# Patient Record
Sex: FEMALE | Race: BLACK OR AFRICAN AMERICAN | NOT HISPANIC OR LATINO | Employment: OTHER | URBAN - METROPOLITAN AREA
[De-identification: names, ages, dates, MRNs, and addresses within clinical notes are randomized per-mention and may not be internally consistent; named-entity substitution may affect disease eponyms.]

---

## 2017-01-16 PROBLEM — Z13.9 SCREENING: Status: ACTIVE | Noted: 2017-01-16

## 2017-01-16 PROBLEM — Z12.11 ENCOUNTER FOR SCREENING COLONOSCOPY: Status: ACTIVE | Noted: 2017-01-16

## 2017-11-13 ENCOUNTER — TELEPHONE (OUTPATIENT)
Dept: ADMINISTRATIVE | Facility: HOSPITAL | Age: 62
End: 2017-11-13

## 2018-05-01 ENCOUNTER — TELEPHONE (OUTPATIENT)
Dept: ADMINISTRATIVE | Facility: HOSPITAL | Age: 63
End: 2018-05-01

## 2018-05-19 ENCOUNTER — HOSPITAL ENCOUNTER (OUTPATIENT)
Dept: SLEEP MEDICINE | Facility: HOSPITAL | Age: 63
Discharge: HOME | End: 2018-05-19
Attending: INTERNAL MEDICINE
Payer: MEDICAID

## 2018-05-19 DIAGNOSIS — G47.33 OSA (OBSTRUCTIVE SLEEP APNEA): ICD-10-CM

## 2018-05-19 DIAGNOSIS — G47.33 OBSTRUCTIVE SLEEP APNEA (ADULT) (PEDIATRIC): ICD-10-CM

## 2018-05-19 PROCEDURE — 95810 POLYSOM 6/> YRS 4/> PARAM: CPT | Mod: 26,,, | Performed by: INTERNAL MEDICINE

## 2018-05-19 PROCEDURE — 95810 POLYSOM 6/> YRS 4/> PARAM: CPT

## 2018-06-06 PROBLEM — H18.453 SALZMANN'S NODULAR DEGENERATION OF CORNEAS OF BOTH EYES: Status: ACTIVE | Noted: 2018-06-06

## 2018-06-06 PROBLEM — H01.006 BLEPHARITIS OF BOTH EYES: Status: ACTIVE | Noted: 2018-06-06

## 2018-06-06 PROBLEM — H01.003 BLEPHARITIS OF BOTH EYES: Status: ACTIVE | Noted: 2018-06-06

## 2018-06-13 PROBLEM — Z78.9 STATIN INTOLERANCE: Status: ACTIVE | Noted: 2018-06-13

## 2018-06-13 PROBLEM — E83.52 HYPERCALCEMIA: Status: ACTIVE | Noted: 2018-06-13

## 2018-06-15 ENCOUNTER — HOSPITAL ENCOUNTER (OUTPATIENT)
Dept: SLEEP MEDICINE | Facility: HOSPITAL | Age: 63
Discharge: HOME | End: 2018-06-15
Attending: INTERNAL MEDICINE
Payer: MEDICAID

## 2018-06-15 DIAGNOSIS — G47.33 OBSTRUCTIVE SLEEP APNEA (ADULT) (PEDIATRIC): ICD-10-CM

## 2018-06-15 PROCEDURE — 95811 POLYSOM 6/>YRS CPAP 4/> PARM: CPT

## 2018-06-15 PROCEDURE — 95811 POLYSOM 6/>YRS CPAP 4/> PARM: CPT | Mod: 26,,, | Performed by: INTERNAL MEDICINE

## 2018-06-29 PROBLEM — G47.34 NOCTURNAL HYPOXEMIA: Status: ACTIVE | Noted: 2018-06-29

## 2019-03-12 PROBLEM — G47.34 NOCTURNAL HYPOXEMIA: Status: RESOLVED | Noted: 2018-06-29 | Resolved: 2019-03-12

## 2019-05-01 PROBLEM — E78.5 HYPERLIPIDEMIA ASSOCIATED WITH TYPE 2 DIABETES MELLITUS: Status: ACTIVE | Noted: 2019-05-01

## 2019-05-01 PROBLEM — E11.69 HYPERLIPIDEMIA ASSOCIATED WITH TYPE 2 DIABETES MELLITUS: Status: ACTIVE | Noted: 2019-05-01

## 2019-05-01 PROBLEM — Z13.820 SCREENING FOR OSTEOPOROSIS: Status: ACTIVE | Noted: 2019-05-01

## 2019-08-05 PROBLEM — Z13.820 SCREENING FOR OSTEOPOROSIS: Status: RESOLVED | Noted: 2019-05-01 | Resolved: 2019-08-05

## 2019-11-20 PROBLEM — I50.30 HEART FAILURE WITH PRESERVED EJECTION FRACTION, NYHA CLASS I: Status: ACTIVE | Noted: 2019-11-20

## 2019-11-20 PROBLEM — E11.69 HYPERLIPIDEMIA ASSOCIATED WITH TYPE 2 DIABETES MELLITUS: Status: RESOLVED | Noted: 2019-05-01 | Resolved: 2019-11-20

## 2019-11-20 PROBLEM — E78.5 HYPERLIPIDEMIA ASSOCIATED WITH TYPE 2 DIABETES MELLITUS: Status: RESOLVED | Noted: 2019-05-01 | Resolved: 2019-11-20

## 2019-11-20 PROBLEM — G47.33 OBSTRUCTIVE SLEEP APNEA: Status: ACTIVE | Noted: 2019-11-20

## 2020-01-16 PROBLEM — Z90.721 HISTORY OF SALPINGO-OOPHORECTOMY: Status: ACTIVE | Noted: 2020-01-16

## 2020-01-16 PROBLEM — Z01.818 PREOP EXAMINATION: Status: RESOLVED | Noted: 2020-01-16 | Resolved: 2020-01-16

## 2020-01-16 PROBLEM — Z01.818 PREOP EXAMINATION: Status: ACTIVE | Noted: 2020-01-16

## 2020-01-16 PROBLEM — N94.89 ADNEXAL MASS: Status: ACTIVE | Noted: 2020-01-16

## 2020-01-16 PROBLEM — Z90.79 HISTORY OF SALPINGO-OOPHORECTOMY: Status: ACTIVE | Noted: 2020-01-16

## 2020-01-16 PROBLEM — Z98.890 S/P EXPLORATORY LAPAROTOMY: Status: ACTIVE | Noted: 2020-01-16

## 2020-01-17 PROBLEM — D62 ACUTE BLOOD LOSS ANEMIA: Status: ACTIVE | Noted: 2020-01-17

## 2020-01-20 ENCOUNTER — PATIENT OUTREACH (OUTPATIENT)
Dept: ADMINISTRATIVE | Facility: CLINIC | Age: 65
End: 2020-01-20

## 2020-01-20 NOTE — PATIENT INSTRUCTIONS
Exploratory Laparotomy     This is an example of the kind of incision the surgeon may use to perform exploratory laparotomy.   Exploratory laparotomy is surgery to open up the belly area (abdomen). This surgery is done to find the cause of problems (such as belly pain or bleeding) that testing could not diagnose. It is also used when an abdominal injury needs emergency medical care. This surgery uses one large cut (incision). The provider can then see and check the organs inside the abdomen. If the cause of the problem is found during the procedure, then treatment is often done at the same time. In some cases, a minimally invasive surgery called exploratory laparoscopy may be used instead. That method uses a tiny camera and several small incisions. But in many cases an exploratory laparotomy is preferred. Read on to learn more about this procedure.  Reasons for the surgery  Organs that may be looked at during exploratory laparotomy include:  · Liver  · Gallbladder  · Spleen  · Pancreas  · Kidneys  · Stomach  · Small intestine (small bowel)  · Large intestine (colon or large bowel)  · Appendix  · Ovaries, fallopian tubes, and uterus (in women)  · Lymph nodes   · Abdominal blood vessels   · Membranes that line the abdominal cavity  Getting ready for the surgery  The surgery takes place in a hospital. It is done by a surgeon. You will likely stay in the hospital for a few days or longer. To prepare for the surgery, do the following:  · Tell your provider about any medicines youre taking. This includes over-the-counter medicines, prescription medicines, herbs, street drugs, herbs, vitamins, and other supplements. You may need to stop taking some or all of them for a time before the surgery.  · Tell your provider if you drink alcohol. This is very important if you are a heavy drinker. Alcohol withdrawal can be life-threatening, so be honest with your provider.  · Also tell your provider if you have any allergies or  other health problems. This includes recent illnesses, especially any bleeding problems.  · Follow any directions you are given for not eating or drinking before surgery.  The day of the surgery  · Most exploratory laparotomies are done as emergency surgery after an injury or accident.   · You will be checked for risks of heart, lung, or other problems during surgery.   · You may need to change into a hospital gown.  · Before the surgery begins, an IV (intravenous) line is put into a vein in your arm or hand. This line supplies fluids and medicines.  · You will be given medicine (general anesthesia) to keep you free of pain. This medicine puts you in a state like deep sleep during the surgery.   · A tube may be placed through your mouth and into your throat to help with breathing. Also, monitors are attached to your body. These record your vital signs, such as heart rate and blood pressure, during the surgery.   · A thin tube (catheter) is placed into your bladder. This tube drains urine from your bladder during the surgery.  During the surgery  · The skin over your belly is cleaned.  · An incision is made in your belly.  · The tissue, blood vessels, and organs in your belly are carefully looked at and checked for problems.  · Tissue samples (biopsy) may be removed and sent to a lab for study.  · If the cause of the problem is found, treatment may be done then, if needed.  · When the surgery is done, the incision is closed with stitches (sutures) or staples. A drain may be placed in the abdomen to remove any extra fluids.  After the surgery  · You will be taken to a room to rest until you have recovered from the anesthesia. Nurses will closely watch your condition. When you are more awake and alert, you will be moved to another room.  · Medicines are given to help prevent infection and to manage pain, if needed.  · You will not be given food or drink until your bowels start to work normally again. This may take a  few days.  · You will need to get up and walk around as soon as you are able. This helps to prevent blood clots.  · Also, you may be given breathing exercises to do. These help prevent pneumonia.  · The tube to drain urine is usually removed within a few days.  · If a drain was used for your incision, this is also removed.  · You will be able to go home when the provider says there are no issues of concern.  · Arrange for an adult family member or friend to drive you home.  · Before leaving, make sure you have all the prescriptions and home care instructions you will need. Also make sure you have a contact number for your provider or the hospital. This is in case you have problems or questions after the surgery.  · Do not lift anything heavier than 5 pounds for about 6 weeks. This gives tissue time to heal, and can prevent a hernia.  When to call your provider  After you get home, call your healthcare provider if you have:  · Fever of 100.4°F (38.0°C) or higher, or as advised by your provider  · Increased pain, redness, swelling, bleeding, or drainage at the incision site  · Pain that cannot be controlled with medicines  · Swollen belly  · Diarrhea or constipation that does not get better within 2 days  · Bloody or black, tarry stools  · Problems or pain with urination  · Chest pain, shortness of breath, or cough that wont go away  · Nausea and vomiting  · Dizziness or fainting  · Swelling or pain in the leg   Follow-up  Recovery time will vary for each person. It may take as long as 4 to 6 weeks. You will need to see your provider for follow-up. This is to remove any sutures or staples and to check your healing progress.  Risks and possible complications  These vary depending on the reason for the surgery. The most common risks and possible complications include:  · Bleeding  · Infection  · Can't find the cause of the problem, so more surgery or other treatments may be needed  · Incision doesn't heal  well  · Damage, injury, or problems with the bowels  · Risks of anesthesia   Date Last Reviewed: 7/1/2016  © 7184-9277 [employer]. [address] [place] [address], [place] [address]. All rights reserved. This information is not intended as a substitute for professional medical care. Always follow your healthcare professional's instructions.        Exploratory Laparotomy     This is an example of the kind of incision the surgeon may use to perform exploratory laparotomy.   Exploratory laparotomy is surgery to open up the belly area (abdomen). This surgery is done to find the cause of problems (such as belly pain or bleeding) that testing could not diagnose. It is also used when an abdominal injury needs emergency medical care. This surgery uses one large cut (incision). The provider can then see and check the organs inside the abdomen. If the cause of the problem is found during the procedure, then treatment is often done at the same time. In some cases, a minimally invasive surgery called exploratory laparoscopy may be used instead. That method uses a tiny camera and several small incisions. But in many cases an exploratory laparotomy is preferred. Read on to learn more about this procedure.  Reasons for the surgery  Organs that may be looked at during exploratory laparotomy include:  · Liver  · Gallbladder  · Spleen  · Pancreas  · Kidneys  · Stomach  · Small intestine (small bowel)  · Large intestine (colon or large bowel)  · Appendix  · Ovaries, fallopian tubes, and uterus (in women)  · Lymph nodes   · Abdominal blood vessels   · Membranes that line the abdominal cavity  Getting ready for the surgery  The surgery takes place in a hospital. It is done by a surgeon. You will likely stay in the hospital for a few days or longer. To prepare for the surgery, do the following:  · Tell your provider about any medicines youre taking. This includes over-the-counter medicines, prescription medicines, herbs, street  drugs, herbs, vitamins, and other supplements. You may need to stop taking some or all of them for a time before the surgery.  · Tell your provider if you drink alcohol. This is very important if you are a heavy drinker. Alcohol withdrawal can be life-threatening, so be honest with your provider.  · Also tell your provider if you have any allergies or other health problems. This includes recent illnesses, especially any bleeding problems.  · Follow any directions you are given for not eating or drinking before surgery.  The day of the surgery  · Most exploratory laparotomies are done as emergency surgery after an injury or accident.   · You will be checked for risks of heart, lung, or other problems during surgery.   · You may need to change into a hospital gown.  · Before the surgery begins, an IV (intravenous) line is put into a vein in your arm or hand. This line supplies fluids and medicines.  · You will be given medicine (general anesthesia) to keep you free of pain. This medicine puts you in a state like deep sleep during the surgery.   · A tube may be placed through your mouth and into your throat to help with breathing. Also, monitors are attached to your body. These record your vital signs, such as heart rate and blood pressure, during the surgery.   · A thin tube (catheter) is placed into your bladder. This tube drains urine from your bladder during the surgery.  During the surgery  · The skin over your belly is cleaned.  · An incision is made in your belly.  · The tissue, blood vessels, and organs in your belly are carefully looked at and checked for problems.  · Tissue samples (biopsy) may be removed and sent to a lab for study.  · If the cause of the problem is found, treatment may be done then, if needed.  · When the surgery is done, the incision is closed with stitches (sutures) or staples. A drain may be placed in the abdomen to remove any extra fluids.  After the surgery  · You will be taken to a  room to rest until you have recovered from the anesthesia. Nurses will closely watch your condition. When you are more awake and alert, you will be moved to another room.  · Medicines are given to help prevent infection and to manage pain, if needed.  · You will not be given food or drink until your bowels start to work normally again. This may take a few days.  · You will need to get up and walk around as soon as you are able. This helps to prevent blood clots.  · Also, you may be given breathing exercises to do. These help prevent pneumonia.  · The tube to drain urine is usually removed within a few days.  · If a drain was used for your incision, this is also removed.  · You will be able to go home when the provider says there are no issues of concern.  · Arrange for an adult family member or friend to drive you home.  · Before leaving, make sure you have all the prescriptions and home care instructions you will need. Also make sure you have a contact number for your provider or the hospital. This is in case you have problems or questions after the surgery.  · Do not lift anything heavier than 5 pounds for about 6 weeks. This gives tissue time to heal, and can prevent a hernia.  When to call your provider  After you get home, call your healthcare provider if you have:  · Fever of 100.4°F (38.0°C) or higher, or as advised by your provider  · Increased pain, redness, swelling, bleeding, or drainage at the incision site  · Pain that cannot be controlled with medicines  · Swollen belly  · Diarrhea or constipation that does not get better within 2 days  · Bloody or black, tarry stools  · Problems or pain with urination  · Chest pain, shortness of breath, or cough that wont go away  · Nausea and vomiting  · Dizziness or fainting  · Swelling or pain in the leg   Follow-up  Recovery time will vary for each person. It may take as long as 4 to 6 weeks. You will need to see your provider for follow-up. This is to remove any  sutures or staples and to check your healing progress.  Risks and possible complications  These vary depending on the reason for the surgery. The most common risks and possible complications include:  · Bleeding  · Infection  · Can't find the cause of the problem, so more surgery or other treatments may be needed  · Incision doesn't heal well  · Damage, injury, or problems with the bowels  · Risks of anesthesia   Date Last Reviewed: 7/1/2016  © 9784-4542 [employer]. [address] [place] [address], [place] [address]. All rights reserved. This information is not intended as a substitute for professional medical care. Always follow your healthcare professional's instructions.

## 2020-01-26 PROBLEM — K56.7 ILEUS FOLLOWING GASTROINTESTINAL SURGERY: Status: ACTIVE | Noted: 2020-01-26

## 2020-01-26 PROBLEM — K56.7 ILEUS, POSTOPERATIVE: Status: ACTIVE | Noted: 2020-01-26

## 2020-01-26 PROBLEM — K91.89 ILEUS, POSTOPERATIVE: Status: ACTIVE | Noted: 2020-01-26

## 2020-01-26 PROBLEM — K91.89 ILEUS FOLLOWING GASTROINTESTINAL SURGERY: Status: ACTIVE | Noted: 2020-01-26

## 2020-01-27 PROBLEM — Z78.9 KNOWLEDGE DEFICIT ABOUT THERAPEUTIC DIET: Status: ACTIVE | Noted: 2020-01-27

## 2020-01-27 PROBLEM — S30.1XXA ABDOMINAL WALL SEROMA: Status: ACTIVE | Noted: 2020-01-27

## 2020-01-31 ENCOUNTER — PATIENT OUTREACH (OUTPATIENT)
Dept: ADMINISTRATIVE | Facility: CLINIC | Age: 65
End: 2020-01-31

## 2020-01-31 NOTE — PROGRESS NOTES
C3 nurse attempted to contact patient. No answer. The following message was left for the patient to return the call:  Good morning I am a nurse calling on behalf of [facility] from the [facility].  This is a Transitional Care Call for Claudette. When you have a moment please contact us at [phone] or [phone] Monday through Friday, between the hours of 8 am to 4 pm. We look forward to speaking with you. On behalf of [facility] have a nice day.    The patient does not have a scheduled HOSFU appointment within 7-14 days post hospital discharge date 1/29/20. Non [facility] provider.

## 2020-01-31 NOTE — PATIENT INSTRUCTIONS
Ileus     The digestive tract.   Ileus occurs when there is a problem with motility in the stomach and small or large intestine (bowel). Motility is the movement of food and waste through the digestive tract. Ileus is not caused by a physical blockage (obstruction).    Normally, muscles in the bowel walls contract to move waste along. Signals from nerves tell the muscles when to contract. With ileus, this movement slows down or stops completely. As a result, waste cant move through the bowels and out of the body. This can cause belly (abdominal) pain and other symptoms. Treatment is needed to restore normal movement and ease symptoms.  Causes of ileus  Ileus can be caused by the following:  · Abdominal surgery  · Abdominal infection  · Injury to blood vessels that supply blood to the abdomen  · Low levels of sodium or potassium (electrolyte imbalance)  · Certain medicines, such as opioid pain medicines  · Certain kidney or lung diseases  · Certain health problems, such as cystic fibrosis and diabetes   Symptoms of ileus  Common symptoms of ileus include:  · Belly swelling or bloating  · Upset stomach (nausea) and vomiting  · Belly cramps  · Constipation or diarrhea   · Loss of appetite  · Not able to keep food down  · Not able to pass stool or gas  Diagnosing ileus  Your healthcare provider will ask about your symptoms and health history. Youll also have a physical exam. If your provider thinks you may have ileus, tests may be done to confirm the problem. These can include:  · Imaging tests. These provide pictures of the bowels. Common tests include X-rays and a CT scan.  · Blood tests. These are done to c heck for infection and other problems, such as fluid loss (dehydration).  · Upper GI (gastrointestinal) series. This test takes X-rays of the upper digestive tract, from the mouth to the small intestine. An X-ray dye (contrast fluid) is used. The dye coats the inside of the upper digestive tract so that it  will show up clearly on X-rays.  Treating ileus  In most cases, ileus goes away by itself when the main cause clears up. The goal is to manage symptoms until movement in the digestive tract returns to normal. Treatment takes place in a hospital. As part of your care, the following may be done:  · No food or drink is given by mouth. This allows your bowels to rest.  · An IV (intravenous) line is placed in a vein in your arm or hand. The IV line is used to give fluids and nutrition. It may also be used to give medicines. These may be needed to improve movement in your digestive tract, or to ease pain. They may also be needed to treat any underlying infections or conditions you have.  · A soft, thin, flexible tube (nasogastric tube) is inserted through your nose and into your stomach. The tube is used to remove extra gas and fluid in your stomach and bowels. This helps to ease symptoms such as pain and swelling.  · Youll be watched closely in the hospital until your symptoms get better. Your provider will tell you when youre well enough to go home. This is usually within a few days.  · In rare cases, problems may occur. Other treatments, such as surgery, may then be done. Your provider will tell you more about other treatments, if needed.  Long-term concerns   After treatment, most people recover completely. In some cases, you may need to see your provider for a follow-up appointment.  When to call your provider  Call your healthcare provider right away if you have any of the following:  · Fever of 100.4°F (38°C) or higher, or as advised by your provider  · Belly swelling or pain that wont go away  · Not able to pass stool or gas  · Nausea and vomiting  · Getting full very easily with only small amounts of food or drink  · Bleeding from the rectum  · Black, tarry stool   Date Last Reviewed: 7/1/2016  © 7353-9171 [employer]. [address] [place] [address], [place] [address]. All rights reserved. This  information is not intended as a substitute for professional medical care. Always follow your healthcare professional's instructions.

## 2020-05-21 PROBLEM — R06.09 DYSPNEA ON EXERTION: Status: ACTIVE | Noted: 2020-05-21

## 2020-05-21 PROBLEM — I50.30 HEART FAILURE WITH PRESERVED EJECTION FRACTION, NYHA CLASS I: Status: RESOLVED | Noted: 2019-11-20 | Resolved: 2020-05-21

## 2020-05-21 PROBLEM — Z01.818 PREOP EXAMINATION: Status: RESOLVED | Noted: 2020-01-16 | Resolved: 2020-05-21

## 2020-05-21 PROBLEM — R94.39 ABNORMAL CARDIOVASCULAR STRESS TEST: Status: ACTIVE | Noted: 2020-05-21

## 2020-05-27 ENCOUNTER — TELEPHONE (OUTPATIENT)
Dept: ADMINISTRATIVE | Facility: HOSPITAL | Age: 65
End: 2020-05-27

## 2020-07-06 PROBLEM — E55.9 VITAMIN D DEFICIENCY: Chronic | Status: ACTIVE | Noted: 2020-07-06

## 2021-05-27 PROBLEM — I27.20 PULMONARY HTN: Status: ACTIVE | Noted: 2021-05-27

## 2021-05-27 PROBLEM — I50.30 HEART FAILURE WITH PRESERVED EJECTION FRACTION, NYHA CLASS I: Status: ACTIVE | Noted: 2021-05-27

## 2022-04-11 PROBLEM — E21.0 PRIMARY HYPERPARATHYROIDISM: Status: ACTIVE | Noted: 2022-04-11

## 2022-04-11 PROBLEM — Z79.899 LONG TERM CURRENT USE OF THERAPEUTIC DRUG: Status: ACTIVE | Noted: 2022-04-11

## 2022-05-24 PROBLEM — E11.69 HYPERLIPIDEMIA ASSOCIATED WITH TYPE 2 DIABETES MELLITUS: Status: RESOLVED | Noted: 2019-05-01 | Resolved: 2022-05-24

## 2022-05-24 PROBLEM — E66.01 CLASS 3 SEVERE OBESITY DUE TO EXCESS CALORIES WITH SERIOUS COMORBIDITY AND BODY MASS INDEX (BMI) OF 40.0 TO 44.9 IN ADULT: Status: ACTIVE | Noted: 2022-05-24

## 2022-05-24 PROBLEM — E78.5 HYPERLIPIDEMIA ASSOCIATED WITH TYPE 2 DIABETES MELLITUS: Status: RESOLVED | Noted: 2019-05-01 | Resolved: 2022-05-24

## 2022-05-24 PROBLEM — G47.33 OSA ON CPAP: Status: ACTIVE | Noted: 2022-05-24

## 2022-05-24 PROBLEM — Z71.89 CPAP USE COUNSELING: Status: ACTIVE | Noted: 2022-05-24

## 2022-05-24 PROBLEM — E11.9 TYPE 2 DIABETES MELLITUS WITHOUT OPHTHALMIC MANIFESTATIONS: Status: ACTIVE | Noted: 2022-05-24

## 2022-05-24 PROBLEM — G47.33 OBSTRUCTIVE SLEEP APNEA: Status: RESOLVED | Noted: 2019-11-20 | Resolved: 2022-05-24

## 2022-11-28 PROBLEM — I50.30 HEART FAILURE WITH PRESERVED EJECTION FRACTION, NYHA CLASS I: Status: RESOLVED | Noted: 2021-05-27 | Resolved: 2022-11-28

## 2023-03-29 PROBLEM — Z91.89 MULTIPLE RISK FACTORS FOR CORONARY ARTERY DISEASE: Status: ACTIVE | Noted: 2023-03-29

## 2023-03-29 PROBLEM — R01.1 HEART MURMUR, SYSTOLIC: Status: ACTIVE | Noted: 2023-03-29

## 2023-03-29 PROBLEM — I27.20 PULMONARY HYPERTENSION: Status: ACTIVE | Noted: 2023-03-29

## 2023-03-29 PROBLEM — I27.20 PULMONARY HTN: Status: RESOLVED | Noted: 2021-05-27 | Resolved: 2023-03-29

## 2023-06-14 PROBLEM — K91.89 ILEUS FOLLOWING GASTROINTESTINAL SURGERY: Status: RESOLVED | Noted: 2020-01-26 | Resolved: 2023-06-14

## 2023-06-14 PROBLEM — K56.7 ILEUS FOLLOWING GASTROINTESTINAL SURGERY: Status: RESOLVED | Noted: 2020-01-26 | Resolved: 2023-06-14

## 2023-06-14 PROBLEM — Z12.11 ENCOUNTER FOR SCREENING COLONOSCOPY: Status: RESOLVED | Noted: 2017-01-16 | Resolved: 2023-06-14

## 2023-06-14 PROBLEM — K56.7 ILEUS, POSTOPERATIVE: Status: RESOLVED | Noted: 2020-01-26 | Resolved: 2023-06-14

## 2023-06-14 PROBLEM — R06.09 DYSPNEA ON EXERTION: Status: RESOLVED | Noted: 2020-05-21 | Resolved: 2023-06-14

## 2023-06-14 PROBLEM — K91.89 ILEUS, POSTOPERATIVE: Status: RESOLVED | Noted: 2020-01-26 | Resolved: 2023-06-14

## 2023-06-14 PROBLEM — Z78.9 KNOWLEDGE DEFICIT ABOUT THERAPEUTIC DIET: Status: RESOLVED | Noted: 2020-01-27 | Resolved: 2023-06-14

## 2023-06-14 PROBLEM — D62 ACUTE BLOOD LOSS ANEMIA: Status: RESOLVED | Noted: 2020-01-17 | Resolved: 2023-06-14

## 2023-06-14 PROBLEM — R94.39 ABNORMAL CARDIOVASCULAR STRESS TEST: Status: RESOLVED | Noted: 2020-05-21 | Resolved: 2023-06-14

## 2023-11-07 ENCOUNTER — TELEPHONE (OUTPATIENT)
Dept: INTERVENTIONAL RADIOLOGY/VASCULAR | Facility: CLINIC | Age: 68
End: 2023-11-07
Payer: MEDICARE

## 2023-11-07 NOTE — TELEPHONE ENCOUNTER
Left message for pt to return my call. Need to schedule IR consult.  Please forward call to [phone]. Thanks

## 2023-11-14 ENCOUNTER — TELEPHONE (OUTPATIENT)
Dept: INTERVENTIONAL RADIOLOGY/VASCULAR | Facility: CLINIC | Age: 68
End: 2023-11-14

## 2023-11-14 ENCOUNTER — OFFICE VISIT (OUTPATIENT)
Dept: INTERVENTIONAL RADIOLOGY/VASCULAR | Facility: CLINIC | Age: 68
End: 2023-11-14
Payer: MEDICARE

## 2023-11-14 DIAGNOSIS — K76.9 LIVER LESION: Primary | ICD-10-CM

## 2023-11-14 PROCEDURE — 3044F PR MOST RECENT HEMOGLOBIN A1C LEVEL <7.0%: ICD-10-PCS | Mod: CPTII,95,, | Performed by: FAMILY MEDICINE

## 2023-11-14 PROCEDURE — 3044F HG A1C LEVEL LT 7.0%: CPT | Mod: CPTII,95,, | Performed by: FAMILY MEDICINE

## 2023-11-14 PROCEDURE — 3060F PR POS MICROALBUMINURIA RESULT DOCUMENTED/REVIEW: ICD-10-PCS | Mod: CPTII,95,, | Performed by: FAMILY MEDICINE

## 2023-11-14 PROCEDURE — 99204 OFFICE O/P NEW MOD 45 MIN: CPT | Mod: 95,,, | Performed by: FAMILY MEDICINE

## 2023-11-14 PROCEDURE — 99204 PR OFFICE/OUTPT VISIT, NEW, LEVL IV, 45-59 MIN: ICD-10-PCS | Mod: 95,,, | Performed by: FAMILY MEDICINE

## 2023-11-14 PROCEDURE — 3066F PR DOCUMENTATION OF TREATMENT FOR NEPHROPATHY: ICD-10-PCS | Mod: CPTII,95,, | Performed by: FAMILY MEDICINE

## 2023-11-14 PROCEDURE — 1159F PR MEDICATION LIST DOCUMENTED IN MEDICAL RECORD: ICD-10-PCS | Mod: CPTII,95,, | Performed by: FAMILY MEDICINE

## 2023-11-14 PROCEDURE — 1160F PR REVIEW ALL MEDS BY PRESCRIBER/CLIN PHARMACIST DOCUMENTED: ICD-10-PCS | Mod: CPTII,95,, | Performed by: FAMILY MEDICINE

## 2023-11-14 PROCEDURE — 4010F ACE/ARB THERAPY RXD/TAKEN: CPT | Mod: CPTII,95,, | Performed by: FAMILY MEDICINE

## 2023-11-14 PROCEDURE — 4010F PR ACE/ARB THEARPY RXD/TAKEN: ICD-10-PCS | Mod: CPTII,95,, | Performed by: FAMILY MEDICINE

## 2023-11-14 PROCEDURE — 1160F RVW MEDS BY RX/DR IN RCRD: CPT | Mod: CPTII,95,, | Performed by: FAMILY MEDICINE

## 2023-11-14 PROCEDURE — 3060F POS MICROALBUMINURIA REV: CPT | Mod: CPTII,95,, | Performed by: FAMILY MEDICINE

## 2023-11-14 PROCEDURE — 3066F NEPHROPATHY DOC TX: CPT | Mod: CPTII,95,, | Performed by: FAMILY MEDICINE

## 2023-11-14 PROCEDURE — 1159F MED LIST DOCD IN RCRD: CPT | Mod: CPTII,95,, | Performed by: FAMILY MEDICINE

## 2023-11-14 NOTE — Clinical Note
"Thank you for referring Ms. Mcdonald to Interventional Radiology at the [facility]. Please don't hesitate to contact us if there are any questions regarding this evaluation at [phone]. If you have any other patients for whom you would like a consultation, please place an order for "[id number]", and we will be happy to review their case.  Sincerely, JAMES Lambert, FNP Interventional Radiology   "

## 2023-11-14 NOTE — TELEPHONE ENCOUNTER
Left message for pt to return my call. Need to schedule labs prior to IR procedure on 12/5/2023.  Please forward call to [phone]. Thanks

## 2023-11-14 NOTE — PROGRESS NOTES
"Subjective     Patient ID: Claudette Brown is a 67 y.o. female.    Chief Complaint: Lesion    Virtual visit with patient referred to Interventional Radiology by Harshal Todd MD for evaluation of liver lesions. Lesions were identified during workup for abdominal pain. 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 She also has a chronic cough that she feels is worsening.       Review of Systems   Constitutional:  Positive for appetite change (decreased due to nausea). Negative for activity change, chills, fatigue and fever.   Respiratory:  Positive for cough and shortness of breath. Negative for wheezing and stridor.    Cardiovascular:  Positive for chest pain (with gagging) and leg swelling (improves with elevation). Negative for palpitations.   Gastrointestinal:  Positive for abdominal pain (when eating) and nausea. Negative for abdominal distention, constipation, diarrhea and vomiting.          Objective     Physical Exam  Constitutional:       General: She is not in acute distress.     Appearance: She is well-developed. She is not diaphoretic.   HENT:      Head: Normocephalic and atraumatic.   Pulmonary:      Effort: Pulmonary effort is normal. No respiratory distress.   Neurological:      Mental Status: She is alert and oriented to person, place, and time.   Psychiatric:         Behavior: Behavior normal.         Thought Content: Thought content normal.         Judgment: Judgment normal.       CT scan 11/6/2023      Reviewed ER notes     Assessment and Plan     1. Liver lesion  -     Cancel: IR Biopsy Liver; Future; Expected date: 11/14/2023  -     IR Biopsy Liver; Future; Expected date: 11/14/2023  -     Protime-INR; Future; Expected date: 11/14/2023        The patient location is: " [place]  The chief complaint leading to consultation is: liver lesions    Visit type: audiovisual    Face to Face time with patient: 20 minutes  25 minutes of total time spent on the encounter, which includes face to face time and non-face to face time preparing to see the patient (eg, review of tests), Obtaining and/or reviewing separately obtained history, Documenting clinical information in the electronic or other health record, Independently interpreting results (not separately reported) and communicating results to the patient/family/caregiver, or Care coordination (not separately reported).         Each patient to whom he or she provides medical services by telemedicine is:  (1) informed of the relationship between the physician and patient and the respective role of any other health care provider with respect to management of the patient; and (2) notified that he or she may decline to receive medical services by telemedicine and may withdraw from such care at any time.    Notes:   Discussed case with Dr. Mcmahon. Explained to patient can offer biopsy of liver lesion. Discussed how the procedure will be performed, risks (including, but not limited to, pain, bleeding, infection, damage to nearby structures, and the need for additional procedures), benefits, possible complications, pre-post procedure expectations, and alternatives. The patient voices understanding and all questions have been answered.  The patient agrees to proceed as planned. Patient scheduled for 12/5/2023 with anesthesia due to history of ANNE with CPAP. Clinic phone number provided.    Advised patient to follow up with provider regarding chronic cough/asthma since patient feels it is worsening.

## 2023-11-15 PROBLEM — N17.9 AKI (ACUTE KIDNEY INJURY): Status: ACTIVE | Noted: 2023-11-15

## 2023-11-15 PROBLEM — R11.2 INTRACTABLE NAUSEA AND VOMITING: Status: ACTIVE | Noted: 2023-11-15

## 2023-11-15 PROBLEM — R16.0 LIVER MASSES: Status: ACTIVE | Noted: 2023-11-15

## 2023-11-15 PROBLEM — R05.3 PERSISTENT COUGH: Status: ACTIVE | Noted: 2023-11-15

## 2023-11-15 PROBLEM — I21.4 NSTEMI (NON-ST ELEVATED MYOCARDIAL INFARCTION): Status: ACTIVE | Noted: 2023-11-15

## 2023-11-18 PROBLEM — J21.0 RSV (ACUTE BRONCHIOLITIS DUE TO RESPIRATORY SYNCYTIAL VIRUS): Status: ACTIVE | Noted: 2023-11-18

## 2023-11-22 PROBLEM — R06.02 SOB (SHORTNESS OF BREATH): Status: ACTIVE | Noted: 2020-05-21

## 2023-11-24 PROBLEM — D62 ACUTE BLOOD LOSS ANEMIA: Status: RESOLVED | Noted: 2020-01-17 | Resolved: 2023-11-24

## 2023-11-24 PROBLEM — D64.9 NORMOCYTIC ANEMIA: Status: ACTIVE | Noted: 2023-11-24

## 2023-11-26 PROBLEM — T73.0XXA STARVATION KETOACIDOSIS: Status: ACTIVE | Noted: 2023-11-26

## 2023-11-26 PROBLEM — E87.20 METABOLIC ACIDOSIS: Status: ACTIVE | Noted: 2023-11-26

## 2023-11-26 PROBLEM — C77.1 MALIGNANT NEOPLASM METASTATIC TO INTRATHORACIC LYMPH NODE: Status: ACTIVE | Noted: 2023-11-15

## 2023-11-26 PROBLEM — E87.29 STARVATION KETOACIDOSIS: Status: ACTIVE | Noted: 2023-11-26

## 2023-11-26 PROBLEM — D69.6 THROMBOCYTOPENIA: Status: ACTIVE | Noted: 2023-11-26

## 2023-11-28 PROBLEM — E46 MALNUTRITION: Status: ACTIVE | Noted: 2023-11-28

## 2023-11-30 PROBLEM — J15.1 PSEUDOMONAL PNEUMONIA: Status: ACTIVE | Noted: 2023-11-30

## 2023-12-03 PROBLEM — M79.671 FOOT PAIN, BILATERAL: Status: ACTIVE | Noted: 2023-12-03

## 2023-12-03 PROBLEM — M79.672 FOOT PAIN, BILATERAL: Status: ACTIVE | Noted: 2023-12-03

## 2023-12-06 PROBLEM — D64.9 NORMOCYTIC ANEMIA: Status: RESOLVED | Noted: 2023-11-24 | Resolved: 2023-12-06
